# Patient Record
Sex: FEMALE | Race: WHITE | NOT HISPANIC OR LATINO | Employment: FULL TIME | URBAN - METROPOLITAN AREA
[De-identification: names, ages, dates, MRNs, and addresses within clinical notes are randomized per-mention and may not be internally consistent; named-entity substitution may affect disease eponyms.]

---

## 2023-08-16 ENCOUNTER — OFFICE VISIT (OUTPATIENT)
Dept: PRIMARY CARE | Facility: CLINIC | Age: 58
End: 2023-08-16
Payer: COMMERCIAL

## 2023-08-16 VITALS
WEIGHT: 215 LBS | HEIGHT: 62 IN | DIASTOLIC BLOOD PRESSURE: 94 MMHG | BODY MASS INDEX: 39.56 KG/M2 | SYSTOLIC BLOOD PRESSURE: 142 MMHG | HEART RATE: 102 BPM

## 2023-08-16 DIAGNOSIS — E11.9 TYPE 2 DIABETES MELLITUS WITHOUT COMPLICATION, WITHOUT LONG-TERM CURRENT USE OF INSULIN (MULTI): Primary | ICD-10-CM

## 2023-08-16 DIAGNOSIS — Z78.0 MENOPAUSE: ICD-10-CM

## 2023-08-16 DIAGNOSIS — R11.0 NAUSEA: ICD-10-CM

## 2023-08-16 DIAGNOSIS — E66.9 CLASS 2 OBESITY WITH BODY MASS INDEX (BMI) OF 39.0 TO 39.9 IN ADULT, UNSPECIFIED OBESITY TYPE, UNSPECIFIED WHETHER SERIOUS COMORBIDITY PRESENT: ICD-10-CM

## 2023-08-16 DIAGNOSIS — Z12.39 BREAST SCREENING: ICD-10-CM

## 2023-08-16 DIAGNOSIS — I10 PRIMARY HYPERTENSION: ICD-10-CM

## 2023-08-16 DIAGNOSIS — Z76.89 ENCOUNTER TO ESTABLISH CARE: ICD-10-CM

## 2023-08-16 PROBLEM — J45.50 SEVERE PERSISTENT ASTHMA WITHOUT COMPLICATION (MULTI): Status: RESOLVED | Noted: 2020-01-17 | Resolved: 2023-08-16

## 2023-08-16 PROBLEM — J45.50 SEVERE PERSISTENT ASTHMA WITHOUT COMPLICATION (MULTI): Status: ACTIVE | Noted: 2020-01-17

## 2023-08-16 PROBLEM — I67.9 CEREBROVASCULAR DISEASE: Status: ACTIVE | Noted: 2023-08-16

## 2023-08-16 PROCEDURE — 3077F SYST BP >= 140 MM HG: CPT | Performed by: NURSE PRACTITIONER

## 2023-08-16 PROCEDURE — 3008F BODY MASS INDEX DOCD: CPT | Performed by: NURSE PRACTITIONER

## 2023-08-16 PROCEDURE — 4010F ACE/ARB THERAPY RXD/TAKEN: CPT | Performed by: NURSE PRACTITIONER

## 2023-08-16 PROCEDURE — 1036F TOBACCO NON-USER: CPT | Performed by: NURSE PRACTITIONER

## 2023-08-16 PROCEDURE — 3080F DIAST BP >= 90 MM HG: CPT | Performed by: NURSE PRACTITIONER

## 2023-08-16 PROCEDURE — 99203 OFFICE O/P NEW LOW 30 MIN: CPT | Performed by: NURSE PRACTITIONER

## 2023-08-16 RX ORDER — ATORVASTATIN CALCIUM 40 MG/1
40 TABLET, FILM COATED ORAL DAILY
COMMUNITY

## 2023-08-16 RX ORDER — VIT C/E/ZN/COPPR/LUTEIN/ZEAXAN 250MG-90MG
2000 CAPSULE ORAL
COMMUNITY
Start: 2022-06-03

## 2023-08-16 RX ORDER — METFORMIN HYDROCHLORIDE 500 MG/1
1000 TABLET ORAL
Qty: 360 TABLET | Refills: 0 | Status: SHIPPED | OUTPATIENT
Start: 2023-08-16 | End: 2023-11-07

## 2023-08-16 RX ORDER — ONDANSETRON 4 MG/1
4 TABLET, FILM COATED ORAL EVERY 8 HOURS PRN
Qty: 30 TABLET | Refills: 1 | Status: SHIPPED | OUTPATIENT
Start: 2023-08-16 | End: 2024-01-22

## 2023-08-16 RX ORDER — LOSARTAN POTASSIUM 50 MG/1
50 TABLET ORAL DAILY
Qty: 90 TABLET | Refills: 1 | Status: SHIPPED | OUTPATIENT
Start: 2023-08-16 | End: 2024-02-16

## 2023-08-16 RX ORDER — FLUTICASONE FUROATE AND VILANTEROL 200; 25 UG/1; UG/1
POWDER RESPIRATORY (INHALATION)
COMMUNITY
Start: 2021-03-05

## 2023-08-16 RX ORDER — METFORMIN HYDROCHLORIDE 500 MG/1
500 TABLET ORAL
COMMUNITY
End: 2023-08-16 | Stop reason: SDUPTHER

## 2023-08-16 RX ORDER — FLASH GLUCOSE SENSOR
KIT MISCELLANEOUS
Qty: 1 EACH | Refills: 0 | Status: SHIPPED | OUTPATIENT
Start: 2023-08-16

## 2023-08-16 RX ORDER — ASPIRIN 81 MG/1
81 TABLET ORAL DAILY
Qty: 30 TABLET | Refills: 11 | COMMUNITY
Start: 2023-08-16

## 2023-08-16 RX ORDER — CLOPIDOGREL BISULFATE 75 MG/1
75 TABLET ORAL DAILY
COMMUNITY
End: 2023-08-16

## 2023-08-16 RX ORDER — LANCETS 33 GAUGE
EACH MISCELLANEOUS
COMMUNITY
Start: 2022-09-12 | End: 2024-05-24 | Stop reason: WASHOUT

## 2023-08-16 RX ORDER — IBUPROFEN 200 MG
CAPSULE ORAL
COMMUNITY
Start: 2022-06-21

## 2023-08-16 RX ORDER — ONDANSETRON 4 MG/1
4 TABLET, FILM COATED ORAL EVERY 8 HOURS PRN
COMMUNITY
Start: 2019-08-28 | End: 2023-08-16 | Stop reason: SDUPTHER

## 2023-08-16 NOTE — PATIENT INSTRUCTIONS
Thank you for seeing me today.  It was a pleasure to meet you!    #T2DM  A1c= 12%  Increase Metformin to 2 tabs twice daily (total 2000 mg /day)  Begin Ozempic 0.25 mg once weekly x 4 weeks, then increase to 0.5 mg x 4 weeks  C/W Atorvastatin  Schedule eye exam    #HTN  Your blood pressure should be </= 130/80.  Today your blood pressure was 142/94  Begin Losartan 50 mg daily    You should avoid foods that are high in sodium and saturated fats  Exercise daily for at least 30 minutes  minutes/week  Avoid stressful situations as this can increase your blood pressure  Take your medications as prescribed--do not skip doses  Obtain a BP monitor and check your blood pressure at home. Check it around the same time each day, at least 1 hour after taking your medication.  Record your blood pressure in a log and  bring your log with you to your next appointment.    Schedule Mammogram    Lab work ordered today.  Please have your blood drawn in the next 1-2 weeks.  You need to be FASTING for 12 hours prior to blood draw.  You may only have water.  Please contact your insurance company to ask about the best location to get blood drawn.  We will contact you with the results of your blood work and any necessary adjustments  to your plan of care, if you do not hear from us within 3-5 days of having your blood drawn, please call the office at [phone].    RTC 3 MONTHS FOR A1C AND AS NEEDED
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
## 2023-11-07 DIAGNOSIS — E11.9 TYPE 2 DIABETES MELLITUS WITHOUT COMPLICATION, WITHOUT LONG-TERM CURRENT USE OF INSULIN (MULTI): ICD-10-CM

## 2023-11-07 RX ORDER — METFORMIN HYDROCHLORIDE 500 MG/1
1000 TABLET ORAL
Qty: 360 TABLET | Refills: 0 | Status: SHIPPED | OUTPATIENT
Start: 2023-11-07 | End: 2024-01-30

## 2023-11-20 NOTE — PROGRESS NOTES
"Subjective   Chief Complaint   Patient presents with    Follow-up     Ameena is here today for F/U and A1C check.        Patient ID: Ameena Beach is a 58 y.o. female who presents for Follow-up (Ameena is here today for F/U and A1C check. ).  HPI  Est 57 yo female presents today for 3 month f/u on T2DM  Pt is due for A1C    Pt states she is feeling great   Down 21#  Not much of an appetite   Pt requests to stay at Ozempic 0.5 mg weekly   Pt requests refill on Herberth sensors    #T2DM  Last A1c=12% in Aug 2023  Rx: Metformin 2000 mg/day,   Rx Ozempic 0.5 mg weekly (Sunday)  ACE/ARB: yes   STATIN: yes  Last eye exam: 2022    Review of Systems  All 13 systems were reviewed and are within normal limits except positive and pertinent negative responses which are noted below or in HPI.      Objective   /80   Pulse 102   Ht 1.575 m (5' 2\")   Wt 91.6 kg (202 lb)   BMI 36.95 kg/m²        Physical Exam  Vitals reviewed.   Cardiovascular:      Pulses: Normal pulses.      Heart sounds: Normal heart sounds.   Pulmonary:      Effort: Pulmonary effort is normal.      Breath sounds: Normal breath sounds.   Neurological:      Mental Status: She is alert.   Psychiatric:         Mood and Affect: Mood normal.         Thought Content: Thought content normal.         Judgment: Judgment normal.         Assessment/Plan   Problem List Items Addressed This Visit             ICD-10-CM    Type 2 diabetes mellitus without complication, without long-term current use of insulin (CMS/HCC) E11.9    Relevant Medications    semaglutide 0.25 mg or 0.5 mg (2 mg/3 mL) pen injector    blood-glucose sensor (FreeStyle Herberth 3 Sensor) device    Other Relevant Orders    POCT glycosylated hemoglobin (Hb A1C) manually resulted (Completed)       "

## 2023-11-21 ENCOUNTER — OFFICE VISIT (OUTPATIENT)
Dept: PRIMARY CARE | Facility: CLINIC | Age: 58
End: 2023-11-21
Payer: COMMERCIAL

## 2023-11-21 VITALS
SYSTOLIC BLOOD PRESSURE: 124 MMHG | BODY MASS INDEX: 37.17 KG/M2 | WEIGHT: 202 LBS | DIASTOLIC BLOOD PRESSURE: 80 MMHG | HEIGHT: 62 IN | HEART RATE: 102 BPM

## 2023-11-21 DIAGNOSIS — E11.9 TYPE 2 DIABETES MELLITUS WITHOUT COMPLICATION, WITHOUT LONG-TERM CURRENT USE OF INSULIN (MULTI): ICD-10-CM

## 2023-11-21 PROBLEM — D18.01 HEMANGIOMA OF SKIN AND SUBCUTANEOUS TISSUE: Status: ACTIVE | Noted: 2019-06-21

## 2023-11-21 PROBLEM — I63.9 CEREBROVASCULAR ACCIDENT (MULTI): Status: ACTIVE | Noted: 2023-11-21

## 2023-11-21 PROBLEM — D22.70 MELANOCYTIC NEVI OF UNSPECIFIED LOWER LIMB, INCLUDING HIP: Status: ACTIVE | Noted: 2019-06-21

## 2023-11-21 PROBLEM — J45.909 ASTHMA (HHS-HCC): Status: ACTIVE | Noted: 2023-11-21

## 2023-11-21 PROBLEM — D48.5 NEOPLASM OF UNCERTAIN BEHAVIOR OF SKIN: Status: ACTIVE | Noted: 2019-06-21

## 2023-11-21 PROBLEM — D22.5 MELANOCYTIC NEVI OF TRUNK: Status: ACTIVE | Noted: 2019-06-21

## 2023-11-21 LAB — POC HEMOGLOBIN A1C: 6.8 % (ref 4.2–6.5)

## 2023-11-21 PROCEDURE — 3079F DIAST BP 80-89 MM HG: CPT | Performed by: NURSE PRACTITIONER

## 2023-11-21 PROCEDURE — 3074F SYST BP LT 130 MM HG: CPT | Performed by: NURSE PRACTITIONER

## 2023-11-21 PROCEDURE — 4010F ACE/ARB THERAPY RXD/TAKEN: CPT | Performed by: NURSE PRACTITIONER

## 2023-11-21 PROCEDURE — 99213 OFFICE O/P EST LOW 20 MIN: CPT | Performed by: NURSE PRACTITIONER

## 2023-11-21 PROCEDURE — 83036 HEMOGLOBIN GLYCOSYLATED A1C: CPT | Performed by: NURSE PRACTITIONER

## 2023-11-21 PROCEDURE — 1036F TOBACCO NON-USER: CPT | Performed by: NURSE PRACTITIONER

## 2023-11-21 PROCEDURE — 3008F BODY MASS INDEX DOCD: CPT | Performed by: NURSE PRACTITIONER

## 2023-11-21 RX ORDER — CEFUROXIME AXETIL 250 MG/1
6 TABLET ORAL
COMMUNITY
Start: 2018-10-24

## 2023-11-21 RX ORDER — BLOOD-GLUCOSE SENSOR
EACH MISCELLANEOUS
Qty: 3 EACH | Refills: 1 | Status: SHIPPED | OUTPATIENT
Start: 2023-11-21 | End: 2024-04-15

## 2023-11-21 RX ORDER — ELETRIPTAN HYDROBROMIDE 40 MG/1
40 TABLET, FILM COATED ORAL
COMMUNITY
Start: 2021-03-19

## 2023-11-21 NOTE — PATIENT INSTRUCTIONS
Thank you for seeing me today.  It was a pleasure to see you again!     #T2DM  A1c= 6.8%---> GREAT JOB!  Wear supportive footwear; do not walk around barefoot and perform daily foot examination and apply lotion on a regular basis to both feet.  Using a thick emery board will help keep nails short and thin.    Diabetes is not terrible and there are many things you can do to prevent problems from diabetes, such as monitoring blood glucose, watching your diet, keeping fit and taking pills regularly.    Taking diabetes medications or injecting insulin regularly can help control your blood glucose level.  Forgetting to take your medication?, Try to set a repeating alarm on your cell phone to remind you to take medication or insulin injection.      If you test your blood glucose at home, try testing on the sides of your fingertips or rotate your fingers, which helps to minimize pain.    Try brisk walking---a convenient, safe and cost-effective way of exercising!  It's good for your heat and will help control blood glucose.    Limit carbohydrates to 60 gm per meal and 15 gm per snack    RTC 3 MONTHS

## 2024-01-20 DIAGNOSIS — R11.0 NAUSEA: ICD-10-CM

## 2024-01-22 RX ORDER — ONDANSETRON 4 MG/1
4 TABLET, FILM COATED ORAL EVERY 8 HOURS PRN
Qty: 30 TABLET | Refills: 1 | Status: SHIPPED | OUTPATIENT
Start: 2024-01-22 | End: 2024-02-22 | Stop reason: SDUPTHER

## 2024-01-29 DIAGNOSIS — E11.9 TYPE 2 DIABETES MELLITUS WITHOUT COMPLICATION, WITHOUT LONG-TERM CURRENT USE OF INSULIN (MULTI): ICD-10-CM

## 2024-01-30 RX ORDER — METFORMIN HYDROCHLORIDE 500 MG/1
1000 TABLET ORAL
Qty: 360 TABLET | Refills: 0 | Status: SHIPPED | OUTPATIENT
Start: 2024-01-30 | End: 2024-02-22 | Stop reason: ALTCHOICE

## 2024-02-16 DIAGNOSIS — I10 PRIMARY HYPERTENSION: ICD-10-CM

## 2024-02-16 RX ORDER — LOSARTAN POTASSIUM 50 MG/1
50 TABLET ORAL DAILY
Qty: 90 TABLET | Refills: 1 | Status: SHIPPED | OUTPATIENT
Start: 2024-02-16 | End: 2024-02-22 | Stop reason: ALTCHOICE

## 2024-02-19 ASSESSMENT — ENCOUNTER SYMPTOMS
DIZZINESS: 0
SPEECH DIFFICULTY: 0
WEAKNESS: 0
NERVOUS/ANXIOUS: 0
TREMORS: 0
CONFUSION: 0
BLURRED VISION: 0
FATIGUE: 0
WEIGHT LOSS: 0
SEIZURES: 0
BLACKOUTS: 0
HEADACHES: 0
POLYPHAGIA: 0
POLYDIPSIA: 0
VISUAL CHANGE: 0
HUNGER: 0
SWEATS: 0

## 2024-02-21 ASSESSMENT — ENCOUNTER SYMPTOMS
BLURRED VISION: 0
POLYPHAGIA: 0
POLYDIPSIA: 0
SEIZURES: 0
VISUAL CHANGE: 0
NERVOUS/ANXIOUS: 0
WEIGHT LOSS: 0
CONFUSION: 0
SPEECH DIFFICULTY: 0
BLACKOUTS: 0
TREMORS: 0
WEAKNESS: 0
FATIGUE: 0
HUNGER: 0
SWEATS: 0
DIZZINESS: 0
HEADACHES: 0

## 2024-02-22 ENCOUNTER — OFFICE VISIT (OUTPATIENT)
Dept: PRIMARY CARE | Facility: CLINIC | Age: 59
End: 2024-02-22
Payer: COMMERCIAL

## 2024-02-22 VITALS
OXYGEN SATURATION: 96 % | DIASTOLIC BLOOD PRESSURE: 73 MMHG | HEIGHT: 62 IN | HEART RATE: 95 BPM | SYSTOLIC BLOOD PRESSURE: 113 MMHG | WEIGHT: 180 LBS | BODY MASS INDEX: 33.13 KG/M2

## 2024-02-22 DIAGNOSIS — J45.20 MILD INTERMITTENT ASTHMA, UNSPECIFIED WHETHER COMPLICATED (HHS-HCC): ICD-10-CM

## 2024-02-22 DIAGNOSIS — I10 PRIMARY HYPERTENSION: ICD-10-CM

## 2024-02-22 DIAGNOSIS — E11.9 TYPE 2 DIABETES MELLITUS WITHOUT COMPLICATION, WITHOUT LONG-TERM CURRENT USE OF INSULIN (MULTI): Primary | ICD-10-CM

## 2024-02-22 DIAGNOSIS — R11.0 NAUSEA: ICD-10-CM

## 2024-02-22 LAB — POC HEMOGLOBIN A1C: 5.8 % (ref 4.2–6.5)

## 2024-02-22 PROCEDURE — 4010F ACE/ARB THERAPY RXD/TAKEN: CPT | Performed by: NURSE PRACTITIONER

## 2024-02-22 PROCEDURE — 3078F DIAST BP <80 MM HG: CPT | Performed by: NURSE PRACTITIONER

## 2024-02-22 PROCEDURE — 3008F BODY MASS INDEX DOCD: CPT | Performed by: NURSE PRACTITIONER

## 2024-02-22 PROCEDURE — 3074F SYST BP LT 130 MM HG: CPT | Performed by: NURSE PRACTITIONER

## 2024-02-22 PROCEDURE — 1036F TOBACCO NON-USER: CPT | Performed by: NURSE PRACTITIONER

## 2024-02-22 PROCEDURE — 83036 HEMOGLOBIN GLYCOSYLATED A1C: CPT | Performed by: NURSE PRACTITIONER

## 2024-02-22 PROCEDURE — 99213 OFFICE O/P EST LOW 20 MIN: CPT | Performed by: NURSE PRACTITIONER

## 2024-02-22 RX ORDER — ONDANSETRON 4 MG/1
4 TABLET, FILM COATED ORAL EVERY 8 HOURS PRN
Qty: 30 TABLET | Refills: 1 | Status: SHIPPED | OUTPATIENT
Start: 2024-02-22 | End: 2024-05-24 | Stop reason: SDUPTHER

## 2024-02-22 RX ORDER — LOSARTAN POTASSIUM 25 MG/1
25 TABLET ORAL DAILY
Qty: 90 TABLET | Refills: 0 | Status: SHIPPED | OUTPATIENT
Start: 2024-02-22 | End: 2024-05-13

## 2024-02-22 NOTE — PROGRESS NOTES
Subjective   Chief Complaint   Patient presents with    Follow-up     Ameena is here today for routine 3 month F/U and A1C check.       Patient ID: Ameena Beach is a 58 y.o. female who presents for Follow-up (Ameena is here today for routine 3 month F/U and A1C check.).    Diabetes  She has type 2 diabetes mellitus. No MedicAlert identification noted. The initial diagnosis of diabetes was made 2 years ago. Pertinent negatives for hypoglycemia include no confusion, dizziness, headaches, hunger, mood changes, nervousness/anxiousness, pallor, seizures, sleepiness, speech difficulty, sweats or tremors. Pertinent negatives for diabetes include no blurred vision, no chest pain, no fatigue, no foot paresthesias, no foot ulcerations, no polydipsia, no polyphagia, no polyuria, no visual change, no weakness and no weight loss. Hypoglycemia complications include hospitalization. Pertinent negatives for hypoglycemia complications include no blackouts, no nocturnal hypoglycemia, no required assistance and no required glucagon injection. Symptoms are stable. Diabetic complications include a CVA. Pertinent negatives for diabetic complications include no heart disease, impotence, nephropathy, peripheral neuropathy, PVD or retinopathy. There are no known risk factors for coronary artery disease. Current diabetic treatment includes diet and oral agent (dual therapy). She is compliant with treatment all of the time. Her weight is decreasing steadily. She is following a diabetic diet. Meal planning includes avoidance of concentrated sweets, calorie counting and carbohydrate counting. She has not had a previous visit with a dietitian. She participates in exercise daily. She monitors blood glucose at home 3-4 x per week. She monitors urine at home <1 x per month. Blood glucose monitoring compliance is good. There is no change in her home blood glucose trend. Her breakfast blood glucose is taken after 10 am. Her breakfast blood glucose range  "is generally 110-130 mg/dl. Her lunch blood glucose is taken between 1-2 pm. Her lunch blood glucose range is generally 110-130 mg/dl. Her dinner blood glucose is taken between 7-8 pm. Her dinner blood glucose range is generally 110-130 mg/dl. Her overall blood glucose range is 110-130 mg/dl. She does not see a podiatrist.Eye exam is current.       Ameena is an est 59 yo female presenting today for 3 month f/u on T2DM    Patient states to be in usual state of health without any recent illnesses, hospitalizations, and no current or remote infections.     Pt is now taking classes at [employer]   Not currently working   Has lost @ 22# since Nov 2023   Feels great, appetite well controlled with Ozempic     #T2DM  Dx'd 2022  Last A1c=12% in Aug 2023----> 6.8% Nov 2023, due today   Rx: Metformin 2000 mg/day  Rx Ozempic 0.5 mg weekly (Saturday)  ACE/ARB: yes   STATIN: yes  Last eye exam: 2024    Pt would like to increase Ozempic to 1 mg weekly and stop the Metformin     Pt would also like to reduce Losartan to 25 mg daily and monitor her BP at home       Review of Systems   Constitutional:  Negative for fatigue and weight loss.   Eyes:  Negative for blurred vision.   Cardiovascular:  Negative for chest pain.   Endocrine: Negative for polydipsia, polyphagia and polyuria.   Genitourinary:  Negative for impotence.   Skin:  Negative for pallor.   Neurological:  Negative for dizziness, tremors, seizures, speech difficulty, weakness and headaches.   Psychiatric/Behavioral:  Negative for confusion. The patient is not nervous/anxious.      All 13 systems were reviewed and are within normal limits except positive and pertinent negative responses which are noted below or in HPI.    Objective   /73   Pulse 95   Ht 1.575 m (5' 2\")   Wt 81.6 kg (180 lb)   SpO2 96%   BMI 32.92 kg/m²      Current Outpatient Medications   Medication Instructions    aspirin 81 mg, oral, Daily    atorvastatin (LIPITOR) 40 mg, oral, Daily    " blood sugar diagnostic (Blood Glucose Test) strip Use as instructed    blood-glucose sensor (FreeStyle Herberth 3 Sensor) device Use 1 sensor every 14 days.    cholecalciferol (VITAMIN D-3) 2,000 Units, oral, Daily RT    eletriptan (RELPAX) 40 mg, oral    fluticasone furoate-vilanteroL (Breo Ellipta) 200-25 mcg/dose inhaler inhalation, Daily RT    FreeStyle Herberth sensor system (FreeStyle Herberth 2 Sensor) kit Use as instructed    losartan (COZAAR) 25 mg, oral, Daily    ondansetron (ZOFRAN) 4 mg, oral, Every 8 hours PRN    semaglutide (OZEMPIC) 1 mg, subcutaneous, Weekly    SUMAtriptan (IMITREX) 6 mg, subcutaneous    TRUEplus Lancets 33 gauge misc TO USE DAILY TO CHECK BLOOD SUGAR.         Physical Exam  Vitals reviewed.   Cardiovascular:      Pulses: Normal pulses.      Heart sounds: Normal heart sounds.   Pulmonary:      Effort: Pulmonary effort is normal.   Skin:     General: Skin is warm and dry.   Neurological:      Mental Status: She is alert.   Psychiatric:         Mood and Affect: Mood normal.         Assessment/Plan   Problem List Items Addressed This Visit             ICD-10-CM    Type 2 diabetes mellitus without complication, without long-term current use of insulin (CMS/[facility]) - Primary E11.9    Relevant Medications    semaglutide (OZEMPIC) 1 mg/dose (4 mg/3 mL) pen injector    Other Relevant Orders    POCT glycosylated hemoglobin (Hb A1C) manually resulted (Completed)    Asthma J45.909     Condition stable based on symptoms and exam.    Continue established treatment plan and follow-up at least yearly.           Primary hypertension I10    Relevant Medications    losartan (Cozaar) 25 mg tablet    Nausea R11.0    Relevant Medications    ondansetron (Zofran) 4 mg tablet

## 2024-02-22 NOTE — PATIENT INSTRUCTIONS
Thank you for seeing me today.  It was a pleasure to see you again!    #HTN  Your blood pressure should be </= 130/80.  Today your blood pressure was 113/73  Reduce Losartan to 25 mg daily and call if BP > 140/80  You should avoid foods that are high in sodium and saturated fats  Exercise daily for at least 30 minutes  minutes/week  Avoid stressful situations as this can increase your blood pressure  Take your medications as prescribed--do not skip doses  Obtain a BP monitor and check your blood pressure at home. Check it around the same time each day, at least 1 hour after taking your medication.  Record your blood pressure in a log and  bring your log with you to your next appointment.    #T2DM  A1C= 5.8%---> this is amazing!!!  Increase Ozempic to 1 mg weekly  Stop Metformin     Wear supportive footwear; do not walk around barefoot and perform daily foot examination and apply lotion on a regular basis to both feet.  Using a thick emery board will help keep nails short and thin.    Diabetes is not terrible and there are many things you can do to prevent problems from diabetes, such as monitoring blood glucose, watching your diet, keeping fit and taking pills regularly.    Taking diabetes medications or injecting insulin regularly can help control your blood glucose level.  Forgetting to take your medication?, Try to set a repeating alarm on your cell phone to remind you to take medication or insulin injection.      If you test your blood glucose at home, try testing on the sides of your fingertips or rotate your fingers, which helps to minimize pain.    Try brisk walking---a convenient, safe and cost-effective way of exercising!  It's good for your heat and will help control blood glucose.    Limit carbohydrates to 60 gm per meal and 15 gm per snack     Schedule Mammogram    RTC 3 MONTHS FOR A1C CHECK

## 2024-02-22 NOTE — ASSESSMENT & PLAN NOTE
Condition stable based on symptoms and exam.    Continue established treatment plan and follow-up at least yearly.

## 2024-04-13 DIAGNOSIS — E11.9 TYPE 2 DIABETES MELLITUS WITHOUT COMPLICATION, WITHOUT LONG-TERM CURRENT USE OF INSULIN (MULTI): ICD-10-CM

## 2024-04-15 RX ORDER — BLOOD-GLUCOSE SENSOR
EACH MISCELLANEOUS
Qty: 6 EACH | Refills: 1 | Status: SHIPPED | OUTPATIENT
Start: 2024-04-15

## 2024-04-19 DIAGNOSIS — Z12.11 COLON CANCER SCREENING: Primary | ICD-10-CM

## 2024-05-11 DIAGNOSIS — I10 PRIMARY HYPERTENSION: ICD-10-CM

## 2024-05-11 DIAGNOSIS — E11.9 TYPE 2 DIABETES MELLITUS WITHOUT COMPLICATION, WITHOUT LONG-TERM CURRENT USE OF INSULIN (MULTI): ICD-10-CM

## 2024-05-13 RX ORDER — LOSARTAN POTASSIUM 25 MG/1
25 TABLET ORAL DAILY
Qty: 90 TABLET | Refills: 0 | Status: SHIPPED | OUTPATIENT
Start: 2024-05-13

## 2024-05-13 RX ORDER — SEMAGLUTIDE 1.34 MG/ML
1 INJECTION, SOLUTION SUBCUTANEOUS
Qty: 9 ML | Refills: 3 | Status: SHIPPED | OUTPATIENT
Start: 2024-05-19 | End: 2024-05-24 | Stop reason: SDUPTHER

## 2024-05-24 ENCOUNTER — OFFICE VISIT (OUTPATIENT)
Dept: PRIMARY CARE | Facility: CLINIC | Age: 59
End: 2024-05-24
Payer: COMMERCIAL

## 2024-05-24 VITALS
SYSTOLIC BLOOD PRESSURE: 111 MMHG | BODY MASS INDEX: 30.14 KG/M2 | HEART RATE: 102 BPM | WEIGHT: 163.8 LBS | HEIGHT: 62 IN | DIASTOLIC BLOOD PRESSURE: 80 MMHG | OXYGEN SATURATION: 94 %

## 2024-05-24 DIAGNOSIS — E11.9 TYPE 2 DIABETES MELLITUS WITHOUT COMPLICATION, WITHOUT LONG-TERM CURRENT USE OF INSULIN (MULTI): Primary | ICD-10-CM

## 2024-05-24 DIAGNOSIS — H02.729 HYPOTRICHOSIS OF EYELID, UNSPECIFIED LATERALITY: ICD-10-CM

## 2024-05-24 DIAGNOSIS — R11.0 NAUSEA: ICD-10-CM

## 2024-05-24 DIAGNOSIS — Z12.39 BREAST SCREENING: ICD-10-CM

## 2024-05-24 LAB — POC HEMOGLOBIN A1C: 5.6 % (ref 4.2–6.5)

## 2024-05-24 PROCEDURE — 3074F SYST BP LT 130 MM HG: CPT | Performed by: NURSE PRACTITIONER

## 2024-05-24 PROCEDURE — 4010F ACE/ARB THERAPY RXD/TAKEN: CPT | Performed by: NURSE PRACTITIONER

## 2024-05-24 PROCEDURE — 3079F DIAST BP 80-89 MM HG: CPT | Performed by: NURSE PRACTITIONER

## 2024-05-24 PROCEDURE — 1036F TOBACCO NON-USER: CPT | Performed by: NURSE PRACTITIONER

## 2024-05-24 PROCEDURE — 83036 HEMOGLOBIN GLYCOSYLATED A1C: CPT | Performed by: NURSE PRACTITIONER

## 2024-05-24 PROCEDURE — 3008F BODY MASS INDEX DOCD: CPT | Performed by: NURSE PRACTITIONER

## 2024-05-24 PROCEDURE — 99212 OFFICE O/P EST SF 10 MIN: CPT | Performed by: NURSE PRACTITIONER

## 2024-05-24 RX ORDER — SEMAGLUTIDE 1.34 MG/ML
1 INJECTION, SOLUTION SUBCUTANEOUS
Qty: 9 ML | Refills: 3 | Status: SHIPPED | OUTPATIENT
Start: 2024-05-26

## 2024-05-24 RX ORDER — BIMATOPROST 3 UG/ML
SOLUTION TOPICAL
Qty: 5 ML | Refills: 2 | Status: SHIPPED | OUTPATIENT
Start: 2024-05-24 | End: 2025-05-24

## 2024-05-24 RX ORDER — ONDANSETRON 4 MG/1
4 TABLET, FILM COATED ORAL EVERY 8 HOURS PRN
Qty: 30 TABLET | Refills: 2 | Status: SHIPPED | OUTPATIENT
Start: 2024-05-24 | End: 2024-08-22

## 2024-05-24 NOTE — ASSESSMENT & PLAN NOTE
Dx'd 2022  Last A1c=12% in Aug 2023----> 6.8% Nov 2023---> 5.8% Feb 2024---> 5.6% today   Rx Ozempic 1 mg weekly (Saturday)  ACE/ARB: yes   STATIN: yes  Last eye exam: 2024

## 2024-05-24 NOTE — PROGRESS NOTES
"Subjective   Chief Complaint   Patient presents with    Diabetes     Patient is coming in for a follow up for her t2dm. Previous A1c Feb 2024 5.8. We will check it today. Patient is currently taking Ozempic 1 mg daily.Patient reports no questions or concerns at this time.        Patient ID: Ameena Beach is a 58 y.o. female who presents for Diabetes (Patient is coming in for a follow up for her t2dm. Previous A1c Feb 2024 5.8. We will check it today. Patient is currently taking Ozempic 1 mg daily.Patient reports no questions or concerns at this time. ).    HPI  Ameena is a 57 yo female presenting today for 3 month f/u on T2DM after medication changes     Pt is taking classes at [employer]   Not currently working   Has lost @ 39# since Nov 2023   Feels great, appetite well controlled with Ozempic      #T2DM  Dx'd 2022  Last A1c=12% in Aug 2023----> 6.8% Nov 2023---> 5.8% Feb 2024---> 5.6% today   Rx: Metformin 2000 mg/day---> pt stopped taking in Feb 2024  Rx Ozempic 1 mg weekly (Saturday)  ACE/ARB: yes   STATIN: yes  Last eye exam: 2024    Pt requesting solution to help with thinning eyelashes      No Known Allergies    Review of Systems  ROS was completed and all systems are negative with the exception of what was noted in the the HPI.       Objective   /80   Pulse 102   Ht 1.575 m (5' 2\")   Wt 74.3 kg (163 lb 12.8 oz)   SpO2 94%   BMI 29.96 kg/m²      Current Outpatient Medications   Medication Instructions    aspirin 81 mg, oral, Daily    atorvastatin (LIPITOR) 40 mg, oral, Daily    bimatoprost (Latisse) 0.03 % ophthalmic solution Place 1 drop on applicator and apply along skin of upper eyelid at base of eyelashes daily at bedtime; rpt for 2nd eye with clean applicator    blood sugar diagnostic (Blood Glucose Test) strip Use as instructed    blood-glucose sensor (FreeStyle Herberth 3 Sensor) device USE 1 SENSOR EVERY 14 DAYS    cholecalciferol (VITAMIN D-3) 2,000 Units, oral, Daily RT    eletriptan " (RELPAX) 40 mg, oral    fluticasone furoate-vilanteroL (Breo Ellipta) 200-25 mcg/dose inhaler inhalation, Daily RT    FreeStyle Herberth sensor system (FreeStyle Herberth 2 Sensor) kit Use as instructed    losartan (COZAAR) 25 mg, oral, Daily    ondansetron (ZOFRAN) 4 mg, oral, Every 8 hours PRN    [START ON 5/26/2024] Ozempic 1 mg, subcutaneous, Once Weekly    SUMAtriptan (IMITREX) 6 mg, subcutaneous     Office Visit on 05/24/2024   Component Date Value Ref Range Status    POC HEMOGLOBIN A1c 05/24/2024 5.6  4.2 - 6.5 % Final         Physical Exam  Vitals reviewed.   Cardiovascular:      Pulses: Normal pulses.   Pulmonary:      Effort: Pulmonary effort is normal.      Breath sounds: Normal breath sounds.   Neurological:      Mental Status: She is alert and oriented to person, place, and time.   Psychiatric:         Mood and Affect: Mood normal.         Assessment/Plan   Problem List Items Addressed This Visit             ICD-10-CM    Type 2 diabetes mellitus without complication, without long-term current use of insulin ([facility]) - Primary E11.9     Dx'd 2022  Last A1c=12% in Aug 2023----> 6.8% Nov 2023---> 5.8% Feb 2024---> 5.6% today   Rx Ozempic 1 mg weekly (Saturday)  ACE/ARB: yes   STATIN: yes  Last eye exam: 2024         Relevant Medications    semaglutide (Ozempic) 1 mg/dose (4 mg/3 mL) pen injector (Start on 5/26/2024)    Other Relevant Orders    POCT glycosylated hemoglobin (Hb A1C) manually resulted (Completed)    Nausea R11.0    Relevant Medications    ondansetron (Zofran) 4 mg tablet     Other Visit Diagnoses         Codes    Breast screening     Z12.39    Relevant Orders    BI mammo bilateral screening tomosynthesis    Hypotrichosis of eyelid, unspecified laterality     H02.729    Relevant Medications    bimatoprost (Latisse) 0.03 % ophthalmic solution

## 2024-05-24 NOTE — PATIENT INSTRUCTIONS
Thank you for seeing me today.  It was a pleasure to see you again!    Today your A1C was 5.6%---> AWESOME!!!!!!  Continue Ozempic 1 mg weekly   Rx Zofran for nausea     Wear supportive footwear; do not walk around barefoot and perform daily foot examination and apply lotion on a regular basis to both feet.  Using a thick emery board will help keep nails short and thin.    Diabetes is not terrible and there are many things you can do to prevent problems from diabetes, such as monitoring blood glucose, watching your diet, keeping fit and taking pills regularly.    Taking diabetes medications or injecting insulin regularly can help control your blood glucose level.  Forgetting to take your medication?, Try to set a repeating alarm on your cell phone to remind you to take medication or insulin injection.      If you test your blood glucose at home, try testing on the sides of your fingertips or rotate your fingers, which helps to minimize pain.    Try brisk walking---a convenient, safe and cost-effective way of exercising!  It's good for your heat and will help control blood glucose.    Limit carbohydrates to 60 gm per meal and 15 gm per snack      For assistance with scheduling referrals or consultations, please call [phone] or [phone].    For laboratory, radiology, and other tests, please call [phone] ([phone] for pediatrics).   If you do not get results within 7-10 days, or you have any questions or concerns, please send a message, call the office ([phone]), or return to the office for a follow-up appointment.     For acute/sick visits, if you are unable to get an office visit, you can do a [facility] Virtual Visit that is accessible via your My Chart account.  For emergencies, call 9-1-1 or go to the nearest Emergency Department.     Please schedule additional appointment(s) to address concern(s) not addressed today.    Please review prescription inserts and published information for  possible adverse effects of all medications.     In general, results are discussed over the phone or via  [facility].     You can see your health information, review clinical summaries from office visits & test results online when you follow your health with MY  Chart, a personal health record.   To sign up go to [email or web address].   If you need assistance with signing up or trouble getting into your account call [facility] Patient Line 24/7 at [phone]     [facility] FOR CPE AUGUST 2024

## 2024-06-07 ENCOUNTER — HOSPITAL ENCOUNTER (OUTPATIENT)
Dept: RADIOLOGY | Facility: HOSPITAL | Age: 59
Discharge: HOME | End: 2024-06-07
Payer: COMMERCIAL

## 2024-06-07 VITALS — BODY MASS INDEX: 29.26 KG/M2 | HEIGHT: 62 IN | WEIGHT: 159 LBS

## 2024-06-07 DIAGNOSIS — Z12.39 BREAST SCREENING: ICD-10-CM

## 2024-06-07 PROCEDURE — 77067 SCR MAMMO BI INCL CAD: CPT

## 2024-06-11 ENCOUNTER — HOSPITAL ENCOUNTER (OUTPATIENT)
Dept: RADIOLOGY | Facility: EXTERNAL LOCATION | Age: 59
Discharge: HOME | End: 2024-06-11

## 2024-06-11 DIAGNOSIS — Z12.39 ENCOUNTER FOR OTHER SCREENING FOR MALIGNANT NEOPLASM OF BREAST: ICD-10-CM

## 2024-08-10 DIAGNOSIS — I10 PRIMARY HYPERTENSION: ICD-10-CM

## 2024-08-12 RX ORDER — LOSARTAN POTASSIUM 25 MG/1
25 TABLET ORAL DAILY
Qty: 90 TABLET | Refills: 1 | Status: SHIPPED | OUTPATIENT
Start: 2024-08-12

## 2024-09-03 ASSESSMENT — ENCOUNTER SYMPTOMS
WEIGHT LOSS: 0
TREMORS: 0
SEIZURES: 0
FATIGUE: 0
BLURRED VISION: 0
BLACKOUTS: 0
POLYPHAGIA: 0
SPEECH DIFFICULTY: 0
POLYDIPSIA: 0
SWEATS: 0
VISUAL CHANGE: 0
WEAKNESS: 0
HEADACHES: 0
NERVOUS/ANXIOUS: 0
DIZZINESS: 0
HUNGER: 0
CONFUSION: 0

## 2024-09-03 NOTE — PROGRESS NOTES
Subjective   Chief Complaint   Patient presents with    Follow-up     Patient is coming in today for a follow up. Dx: HTN, T2DM       Patient ID: Ameena Beach is a 59 y.o. female who presents for Follow-up (Patient is coming in today for a follow up. Dx: HTN, T2DM).    Diabetes  She has type 2 diabetes mellitus. No MedicAlert identification noted. The initial diagnosis of diabetes was made 1 years ago. Pertinent negatives for hypoglycemia include no confusion, dizziness, headaches, hunger, mood changes, nervousness/anxiousness, pallor, seizures, sleepiness, speech difficulty, sweats or tremors. Pertinent negatives for diabetes include no blurred vision, no chest pain, no fatigue, no foot paresthesias, no foot ulcerations, no polydipsia, no polyphagia, no polyuria, no visual change, no weakness and no weight loss. Pertinent negatives for hypoglycemia complications include no blackouts, no hospitalization, no nocturnal hypoglycemia, no required assistance and no required glucagon injection. Symptoms are stable. Pertinent negatives for diabetic complications include no CVA, heart disease, impotence, nephropathy, peripheral neuropathy, PVD or retinopathy. Risk factors for coronary artery disease include obesity. Current diabetic treatment includes diet and oral agent (monotherapy). She is compliant with treatment all of the time. Her weight is decreasing steadily. She is following a generally healthy diet. Meal planning includes carbohydrate counting. She has not had a previous visit with a dietitian. She participates in exercise daily. She monitors blood glucose at home 1-2 x per week. She monitors urine at home <1 x per month. Blood glucose monitoring compliance is excellent. There is no change in her home blood glucose trend. Her breakfast blood glucose is taken after 10 am. Her breakfast blood glucose range is generally  mg/dl. Her lunch blood glucose is taken after 3 pm. Her lunch blood glucose range is  "generally  mg/dl. Her dinner blood glucose is taken between 6-7 pm. Her dinner blood glucose range is generally  mg/dl. Her bedtime blood glucose is taken after 11 pm. Her bedtime blood glucose range is generally 110-130 mg/dl. Her overall blood glucose range is  mg/dl. She does not see a podiatrist.Eye exam is current.     Ameena is an est 60 yo female presenting today for 3 mo f/u on T2DM    Dx: HTN, T2DM     Pt is taking classes at [employer]   Not currently working     Doing really well with weight loss  Denies any acute c/o today    #T2DM  Dx'd 2022  Last A1c=12% in Aug 2023----> 6.8% Nov 2023---> 5.8% Feb 2024---> 5.6% May 2024----> 5.4% today   Rx: Metformin 2000 mg/day---> pt stopped taking in Feb 2024  Rx Ozempic 1 mg weekly (Thursday)  ACE/ARB: yes   STATIN: yes  Last eye exam: 2024     Pt has been using Latisse to help with thinning eyelashes  States she feels it is helping, would like refill    Pt also requesting refill on Zofran to help with occasional nausea 2/2 Ozempic use     No Known Allergies    Review of Systems   Constitutional:  Negative for fatigue and weight loss.   Eyes:  Negative for blurred vision.   Cardiovascular:  Negative for chest pain.   Endocrine: Negative for polydipsia, polyphagia and polyuria.   Genitourinary:  Negative for impotence.   Skin:  Negative for pallor.   Neurological:  Negative for dizziness, tremors, seizures, speech difficulty, weakness and headaches.   Psychiatric/Behavioral:  Negative for confusion. The patient is not nervous/anxious.      ROS was completed and all systems are negative with the exception of what was noted in the the HPI.       Objective   /73   Pulse 102   Ht 1.575 m (5' 2\")   Wt 67.7 kg (149 lb 3.2 oz)   SpO2 97%   BMI 27.29 kg/m²      Current Outpatient Medications   Medication Instructions    aspirin 81 mg, oral, Daily    atorvastatin (LIPITOR) 40 mg, oral, Daily    bimatoprost (Latisse) 0.03 % ophthalmic solution " Place 1 drop on applicator and apply along skin of upper eyelid at base of eyelashes daily at bedtime; rpt for 2nd eye with clean applicator    blood sugar diagnostic (Blood Glucose Test) strip Use as instructed    blood-glucose sensor (FreeStyle Herberth 3 Sensor) device USE 1 SENSOR EVERY 14 DAYS    cholecalciferol (VITAMIN D-3) 2,000 Units, oral, Daily RT    eletriptan (RELPAX) 40 mg, oral, Once as needed    fluticasone furoate-vilanteroL (Breo Ellipta) 200-25 mcg/dose inhaler inhalation, Daily RT    FreeStyle Herberth sensor system (FreeStyle Herberth 2 Sensor) kit Use as instructed    losartan (COZAAR) 25 mg, oral, Daily    ondansetron ODT (ZOFRAN-ODT) 4 mg, oral, Every 8 hours PRN    [START ON 9/8/2024] Ozempic 1 mg, subcutaneous, Once Weekly    SUMAtriptan (IMITREX) 6 mg, subcutaneous, As needed     Office Visit on 09/04/2024   Component Date Value Ref Range Status    POC HEMOGLOBIN A1c 09/04/2024 5.4  4.2 - 6.5 % Final         Physical Exam  Vitals reviewed.   Cardiovascular:      Pulses: Normal pulses.   Pulmonary:      Effort: Pulmonary effort is normal.   Neurological:      Mental Status: She is alert and oriented to person, place, and time.   Psychiatric:         Mood and Affect: Mood normal.         Assessment & Plan  Type 2 diabetes mellitus without complication, without long-term current use of insulin (Multi)  Dx'd 2022  Last A1c=12% in Aug 2023----> 6.8% Nov 2023---> 5.8% Feb 2024---> 5.6% May 2024 ---> 5.4% today   Rx: Metformin 2000 mg/day---> pt stopped taking in Feb 2024  Rx Ozempic 1 mg weekly (Saturday)  ACE/ARB: yes   STATIN: yes  Last eye exam: 2024    Orders:    POCT glycosylated hemoglobin (Hb A1C) manually resulted    Lipid Panel; Future    atorvastatin (Lipitor) 40 mg tablet; Take 1 tablet (40 mg) by mouth once daily.    semaglutide (Ozempic) 1 mg/dose (4 mg/3 mL) pen injector; Inject 1 mg under the skin 1 (one) time per week.    Primary hypertension  Stable today 103/73  Continue Losartan 25 mg  daily   CMP, Lipids ordered   Orders:    Comprehensive Metabolic Panel; Future    losartan (Cozaar) 25 mg tablet; Take 1 tablet (25 mg) by mouth once daily.    Migraine with aura and without status migrainosus, not intractable  Stable   Last migraine: last month   Orders:    eletriptan (Relpax) 40 mg tablet; Take 1 tablet (40 mg) by mouth 1 time if needed for migraine (MIGRAINE).    SUMAtriptan (Imitrex) 6 mg/0.5 mL injection; Inject 0.5 mL (6 mg) under the skin if needed for migraine.    Hypotrichosis of eyelid, unspecified laterality    Orders:    bimatoprost (Latisse) 0.03 % ophthalmic solution; Place 1 drop on applicator and apply along skin of upper eyelid at base of eyelashes daily at bedtime; rpt for 2nd eye with clean applicator    Nausea    Orders:    ondansetron ODT (Zofran-ODT) 4 mg disintegrating tablet; Take 1 tablet (4 mg) by mouth every 8 hours if needed for nausea or vomiting for up to 7 days.

## 2024-09-03 NOTE — ASSESSMENT & PLAN NOTE
Dx'd 2022  Last A1c=12% in Aug 2023----> 6.8% Nov 2023---> 5.8% Feb 2024---> 5.6% May 2024 ---> 5.4% today   Rx: Metformin 2000 mg/day---> pt stopped taking in Feb 2024  Rx Ozempic 1 mg weekly (Saturday)  ACE/ARB: yes   STATIN: yes  Last eye exam: 2024    Orders:    POCT glycosylated hemoglobin (Hb A1C) manually resulted    Lipid Panel; Future    atorvastatin (Lipitor) 40 mg tablet; Take 1 tablet (40 mg) by mouth once daily.    semaglutide (Ozempic) 1 mg/dose (4 mg/3 mL) pen injector; Inject 1 mg under the skin 1 (one) time per week.

## 2024-09-04 ENCOUNTER — APPOINTMENT (OUTPATIENT)
Dept: PRIMARY CARE | Facility: CLINIC | Age: 59
End: 2024-09-04
Payer: COMMERCIAL

## 2024-09-04 VITALS
DIASTOLIC BLOOD PRESSURE: 73 MMHG | OXYGEN SATURATION: 97 % | HEIGHT: 62 IN | HEART RATE: 102 BPM | WEIGHT: 149.2 LBS | SYSTOLIC BLOOD PRESSURE: 103 MMHG | BODY MASS INDEX: 27.46 KG/M2

## 2024-09-04 DIAGNOSIS — I10 PRIMARY HYPERTENSION: ICD-10-CM

## 2024-09-04 DIAGNOSIS — H02.729 HYPOTRICHOSIS OF EYELID, UNSPECIFIED LATERALITY: ICD-10-CM

## 2024-09-04 DIAGNOSIS — G43.109 MIGRAINE WITH AURA AND WITHOUT STATUS MIGRAINOSUS, NOT INTRACTABLE: ICD-10-CM

## 2024-09-04 DIAGNOSIS — R11.0 NAUSEA: ICD-10-CM

## 2024-09-04 DIAGNOSIS — E11.9 TYPE 2 DIABETES MELLITUS WITHOUT COMPLICATION, WITHOUT LONG-TERM CURRENT USE OF INSULIN (MULTI): Primary | ICD-10-CM

## 2024-09-04 LAB — POC HEMOGLOBIN A1C: 5.4 % (ref 4.2–6.5)

## 2024-09-04 PROCEDURE — 83036 HEMOGLOBIN GLYCOSYLATED A1C: CPT | Performed by: NURSE PRACTITIONER

## 2024-09-04 PROCEDURE — 3078F DIAST BP <80 MM HG: CPT | Performed by: NURSE PRACTITIONER

## 2024-09-04 PROCEDURE — 99213 OFFICE O/P EST LOW 20 MIN: CPT | Performed by: NURSE PRACTITIONER

## 2024-09-04 PROCEDURE — 1036F TOBACCO NON-USER: CPT | Performed by: NURSE PRACTITIONER

## 2024-09-04 PROCEDURE — 3008F BODY MASS INDEX DOCD: CPT | Performed by: NURSE PRACTITIONER

## 2024-09-04 PROCEDURE — 4010F ACE/ARB THERAPY RXD/TAKEN: CPT | Performed by: NURSE PRACTITIONER

## 2024-09-04 PROCEDURE — 3074F SYST BP LT 130 MM HG: CPT | Performed by: NURSE PRACTITIONER

## 2024-09-04 RX ORDER — LOSARTAN POTASSIUM 25 MG/1
25 TABLET ORAL DAILY
Qty: 90 TABLET | Refills: 1 | Status: SHIPPED | OUTPATIENT
Start: 2024-09-04

## 2024-09-04 RX ORDER — ONDANSETRON 4 MG/1
4 TABLET, ORALLY DISINTEGRATING ORAL EVERY 8 HOURS PRN
Qty: 20 TABLET | Refills: 0 | Status: SHIPPED | OUTPATIENT
Start: 2024-09-04 | End: 2024-09-11

## 2024-09-04 RX ORDER — ELETRIPTAN HYDROBROMIDE 40 MG/1
40 TABLET, FILM COATED ORAL ONCE AS NEEDED
Qty: 6 TABLET | Refills: 2 | Status: SHIPPED | OUTPATIENT
Start: 2024-09-04

## 2024-09-04 RX ORDER — BIMATOPROST 3 UG/ML
SOLUTION TOPICAL
Qty: 5 ML | Refills: 2 | Status: SHIPPED | OUTPATIENT
Start: 2024-09-04 | End: 2025-09-04

## 2024-09-04 RX ORDER — CEFUROXIME AXETIL 250 MG/1
6 TABLET ORAL AS NEEDED
Qty: 0.5 ML | Refills: 1 | Status: SHIPPED | OUTPATIENT
Start: 2024-09-04

## 2024-09-04 RX ORDER — ATORVASTATIN CALCIUM 40 MG/1
40 TABLET, FILM COATED ORAL DAILY
Qty: 90 TABLET | Refills: 3 | Status: SHIPPED | OUTPATIENT
Start: 2024-09-04

## 2024-09-04 RX ORDER — SEMAGLUTIDE 1.34 MG/ML
1 INJECTION, SOLUTION SUBCUTANEOUS
Qty: 9 ML | Refills: 3 | Status: SHIPPED | OUTPATIENT
Start: 2024-09-08

## 2024-09-04 ASSESSMENT — ENCOUNTER SYMPTOMS
POLYDIPSIA: 0
SEIZURES: 0
NERVOUS/ANXIOUS: 0
SWEATS: 0
HUNGER: 0
SPEECH DIFFICULTY: 0
POLYPHAGIA: 0
HEADACHES: 0
BLURRED VISION: 0
FATIGUE: 0
VISUAL CHANGE: 0
DIZZINESS: 0
WEAKNESS: 0
CONFUSION: 0
TREMORS: 0
BLACKOUTS: 0
WEIGHT LOSS: 0

## 2024-09-04 NOTE — ASSESSMENT & PLAN NOTE
Stable today 103/73  Continue Losartan 25 mg daily   CMP, Lipids ordered   Orders:    Comprehensive Metabolic Panel; Future    losartan (Cozaar) 25 mg tablet; Take 1 tablet (25 mg) by mouth once daily.

## 2024-09-04 NOTE — ASSESSMENT & PLAN NOTE
Stable   Last migraine: last month   Orders:    eletriptan (Relpax) 40 mg tablet; Take 1 tablet (40 mg) by mouth 1 time if needed for migraine (MIGRAINE).    SUMAtriptan (Imitrex) 6 mg/0.5 mL injection; Inject 0.5 mL (6 mg) under the skin if needed for migraine.

## 2024-09-04 NOTE — PATIENT INSTRUCTIONS
Thank you for seeing me today.  It was a pleasure to see you again!    #T2DM  Today your A1C was 5.4%---> great job!    Wear supportive footwear; do not walk around barefoot and perform daily foot examination and apply lotion on a regular basis to both feet.  Using a thick emery board will help keep nails short and thin.    Diabetes is not terrible and there are many things you can do to prevent problems from diabetes, such as monitoring blood glucose, watching your diet, keeping fit and taking pills regularly.    Taking diabetes medications or injecting insulin regularly can help control your blood glucose level.  Forgetting to take your medication?, Try to set a repeating alarm on your cell phone to remind you to take medication or insulin injection.      If you test your blood glucose at home, try testing on the sides of your fingertips or rotate your fingers, which helps to minimize pain.    Try brisk walking---a convenient, safe and cost-effective way of exercising!  It's good for your heat and will help control blood glucose.    Limit carbohydrates to 60 gm per meal and 15 gm per snack     Lab work ordered today.  Please have your blood drawn in the next 1-2 weeks.  You need to be FASTING for 12 hours prior to blood draw.  You may only have water.  Please contact your insurance company to ask about the best location to get blood drawn.  We will contact you with the results of your blood work and any necessary adjustments  to your plan of care, if you do not hear from us within 3-5 days of having your blood drawn, please call the office at [phone].    For assistance with scheduling referrals or consultations, please call [phone] or [phone].    For laboratory, radiology, and other tests, please call [phone] ([phone] for pediatrics).   If you do not get results within 7-10 days, or you have any questions or concerns, please send a message, call the office ([phone]), or return to the  office for a follow-up appointment.     For acute/sick visits, if you are unable to get an office visit, you can do a  On Demand Virtual Visit that is accessible via your My Chart account.  For emergencies, call 9-1-1 or go to the nearest Emergency Department.     Please schedule additional appointment(s) to address concern(s) not addressed today.    Please review prescription inserts and published information for possible adverse effects of all medications.     In general, results are discussed over the phone or via  [facility].     You can see your health information, review clinical summaries from office visits & test results online when you follow your health with MY  Chart, a personal health record.   To sign up go to [email or web address].   If you need assistance with signing up or trouble getting into your account call [facility] Patient Line 24/7 at [phone]     RT 6 MONTHS AND AS NEEDED

## 2024-09-04 NOTE — ASSESSMENT & PLAN NOTE
Orders:    ondansetron ODT (Zofran-ODT) 4 mg disintegrating tablet; Take 1 tablet (4 mg) by mouth every 8 hours if needed for nausea or vomiting for up to 7 days.     DISCHARGE

## 2024-11-26 DIAGNOSIS — G43.109 MIGRAINE WITH AURA AND WITHOUT STATUS MIGRAINOSUS, NOT INTRACTABLE: ICD-10-CM

## 2024-11-26 RX ORDER — ELETRIPTAN HYDROBROMIDE 40 MG/1
40 TABLET, FILM COATED ORAL ONCE AS NEEDED
Qty: 6 TABLET | Refills: 2 | Status: SHIPPED | OUTPATIENT
Start: 2024-11-26

## 2024-12-06 ENCOUNTER — APPOINTMENT (OUTPATIENT)
Dept: PRIMARY CARE | Facility: CLINIC | Age: 59
End: 2024-12-06
Payer: COMMERCIAL

## 2024-12-16 NOTE — ASSESSMENT & PLAN NOTE
A1C= 5.3%  Continue Ozempic 1 mg weekly  Regular exercise  Routine eye exams     Orders:    POCT glycosylated hemoglobin (Hb A1C) manually resulted    Albumin-Creatinine Ratio, Urine Random; Future

## 2024-12-16 NOTE — PROGRESS NOTES
"Subjective   Chief Complaint   Patient presents with    Follow-up     Ameena Beach is a 59 y.o. female is here today for a follow up. No questions or concerns at this time.           Patient ID: Ameena Baech is a 59 y.o. female who presents for Follow-up (Ameena Beach is a 59 y.o. female is here today for a follow up. No questions or concerns at this time.//).    HPI  Ameena is a 58 yo female presenting today for 3 mo f/u on T2DM  LOV: 9/4/2024    Dx: HTN, T2DM      Pt is taking classes at [employer]   Not currently working      Doing really well with her weight loss journey   Denies any acute c/o today    Needs refill on Zofran      #T2DM  Dx'd 2022  Last A1c=12% in Aug 2023----> 6.8% Nov 2023---> 5.8% Feb 2024---> 5.6% May 2024----> 5.4% Sept 2024  Rx: Metformin 2000 mg/day---> pt stopped in Feb 2024  Rx Ozempic 1 mg weekly (Thursday), c/o mild nausea   ACE/ARB: yes   STATIN: yes  Last eye exam: 2024     BP well controlled today 115/80  Rx Losartan 25 mg daily  No c/o CP/CAMILO     No Known Allergies    Review of Systems  ROS was completed and all systems are negative with the exception of what was noted in the the HPI.       Objective   /80   Pulse 94   Ht 1.575 m (5' 2\")   Wt 59.6 kg (131 lb 6.4 oz)   SpO2 95%   BMI 24.03 kg/m²      Current Outpatient Medications   Medication Instructions    aspirin 81 mg, Daily    atorvastatin (LIPITOR) 40 mg, oral, Daily    bimatoprost (Latisse) 0.03 % ophthalmic solution Place 1 drop on applicator and apply along skin of upper eyelid at base of eyelashes daily at bedtime; rpt for 2nd eye with clean applicator    blood sugar diagnostic (Blood Glucose Test) strip Use as instructed    blood-glucose sensor (FreeStyle Herberth 3 Sensor) device USE 1 SENSOR EVERY 14 DAYS    cholecalciferol (VITAMIN D-3) 2,000 Units, Daily RT    eletriptan (RELPAX) 40 mg, oral, Once as needed    fluticasone furoate-vilanteroL (Breo Ellipta) 200-25 mcg/dose inhaler Daily RT    Johan Kevin" sensor system (FreeStyle Herberth 2 Sensor) kit Use as instructed    losartan (COZAAR) 25 mg, oral, Daily    ondansetron (ZOFRAN) 4 mg, oral, Every 8 hours PRN    ondansetron ODT (ZOFRAN-ODT) 4 mg, oral, Every 8 hours PRN    Ozempic 1 mg, subcutaneous, Once Weekly    SUMAtriptan (IMITREX) 6 mg, subcutaneous, As needed     Office Visit on 12/17/2024   Component Date Value Ref Range Status    POC HEMOGLOBIN A1c 12/17/2024 5.3  4.2 - 6.5 % Final         Physical Exam  Vitals reviewed.   Cardiovascular:      Pulses: Normal pulses.      Heart sounds: Normal heart sounds.   Pulmonary:      Effort: Pulmonary effort is normal.      Breath sounds: Normal breath sounds.   Neurological:      Mental Status: She is alert and oriented to person, place, and time.   Psychiatric:         Mood and Affect: Mood normal.         Assessment & Plan  Type 2 diabetes mellitus without complication, without long-term current use of insulin (Multi)  A1C= 5.3%  Continue Ozempic 1 mg weekly  Regular exercise  Routine eye exams     Orders:    POCT glycosylated hemoglobin (Hb A1C) manually resulted    Albumin-Creatinine Ratio, Urine Random; Future    Primary hypertension  Stable today: 115/80  Continue Losartan 25 mg daily        Nausea  Refill on Zofran   Orders:    ondansetron ODT (Zofran-ODT) 4 mg disintegrating tablet; Dissolve 1 tablet (4 mg) in the mouth every 8 hours if needed for nausea or vomiting for up to 7 days.    Menopause    Orders:    Vitamin D 25-Hydroxy,Total (for eval of Vitamin D levels); Future

## 2024-12-17 ENCOUNTER — APPOINTMENT (OUTPATIENT)
Dept: PRIMARY CARE | Facility: CLINIC | Age: 59
End: 2024-12-17
Payer: COMMERCIAL

## 2024-12-17 VITALS
SYSTOLIC BLOOD PRESSURE: 115 MMHG | HEART RATE: 94 BPM | BODY MASS INDEX: 24.18 KG/M2 | DIASTOLIC BLOOD PRESSURE: 80 MMHG | OXYGEN SATURATION: 95 % | HEIGHT: 62 IN | WEIGHT: 131.4 LBS

## 2024-12-17 DIAGNOSIS — Z78.0 MENOPAUSE: ICD-10-CM

## 2024-12-17 DIAGNOSIS — R11.0 NAUSEA: ICD-10-CM

## 2024-12-17 DIAGNOSIS — E11.9 TYPE 2 DIABETES MELLITUS WITHOUT COMPLICATION, WITHOUT LONG-TERM CURRENT USE OF INSULIN (MULTI): Primary | ICD-10-CM

## 2024-12-17 DIAGNOSIS — I10 PRIMARY HYPERTENSION: ICD-10-CM

## 2024-12-17 LAB — POC HEMOGLOBIN A1C: 5.3 % (ref 4.2–6.5)

## 2024-12-17 PROCEDURE — 3074F SYST BP LT 130 MM HG: CPT | Performed by: NURSE PRACTITIONER

## 2024-12-17 PROCEDURE — 4010F ACE/ARB THERAPY RXD/TAKEN: CPT | Performed by: NURSE PRACTITIONER

## 2024-12-17 PROCEDURE — 83036 HEMOGLOBIN GLYCOSYLATED A1C: CPT | Performed by: NURSE PRACTITIONER

## 2024-12-17 PROCEDURE — 3008F BODY MASS INDEX DOCD: CPT | Performed by: NURSE PRACTITIONER

## 2024-12-17 PROCEDURE — 99213 OFFICE O/P EST LOW 20 MIN: CPT | Performed by: NURSE PRACTITIONER

## 2024-12-17 PROCEDURE — 1036F TOBACCO NON-USER: CPT | Performed by: NURSE PRACTITIONER

## 2024-12-17 PROCEDURE — 3079F DIAST BP 80-89 MM HG: CPT | Performed by: NURSE PRACTITIONER

## 2024-12-17 RX ORDER — ONDANSETRON 4 MG/1
4 TABLET, ORALLY DISINTEGRATING ORAL EVERY 8 HOURS PRN
Qty: 20 TABLET | Refills: 2 | Status: SHIPPED | OUTPATIENT
Start: 2024-12-17 | End: 2024-12-24

## 2024-12-17 NOTE — ASSESSMENT & PLAN NOTE
Refill on Zofran   Orders:    ondansetron ODT (Zofran-ODT) 4 mg disintegrating tablet; Dissolve 1 tablet (4 mg) in the mouth every 8 hours if needed for nausea or vomiting for up to 7 days.

## 2024-12-17 NOTE — PATIENT INSTRUCTIONS
Thank you for seeing me today Ameena Beach, it was a pleasure to see you again!    #T2DM  Today your A1C was 5.3%  Continue Ozempic   Zofran for nausea     Wear supportive footwear; do not walk around barefoot and perform daily foot examination and apply lotion on a regular basis to both feet.  Using a thick emery board will help keep nails short and thin.    Diabetes is not terrible and there are many things you can do to prevent problems from diabetes, such as monitoring blood glucose, watching your diet, keeping fit and taking pills regularly.    Taking diabetes medications or injecting insulin regularly can help control your blood glucose level.  Forgetting to take your medication?, Try to set a repeating alarm on your cell phone to remind you to take medication or insulin injection.      If you test your blood glucose at home, try testing on the sides of your fingertips or rotate your fingers, which helps to minimize pain.    Try brisk walking---a convenient, safe and cost-effective way of exercising!  It's good for your heat and will help control blood glucose.    Limit carbohydrates to 60 gm per meal and 15 gm per snack    #HTN  Blood Pressure was good at 115/80  Continue Losartan 25 mg daily       For assistance with scheduling referrals or consultations, please call [phone] or [phone].    For laboratory, radiology, and other tests, please call [phone] ([phone] for pediatrics).   For laboratory locations, please visit [email or web address]   If you do not get results within 7-10 days, or you have any questions or concerns, please send a message, call the office ([phone]), or return to the office for a follow-up appointment.     For acute/sick visits, if you are unable to get an office visit, you can do a [facility] On Demand Virtual Visit that is accessible via your My Chart account.  For emergencies, call 9-1-1 or go to the nearest Emergency Department.      Please schedule additional appointment(s) to address concern(s) not addressed today.    Please review prescription inserts and published information for possible adverse effects of all medications.     In general, results are discussed over the phone or via  [employer].     You can see your health information, review clinical summaries from office visits & test results online when you follow your health with MY  Chart, a personal health record.   To sign up go to [email or web address].   If you need assistance with signing up or trouble getting into your account call [employer] Patient Line 24/7 at [phone]     RTC 6 MONTHS AND AS NEEDED     Have a nice day and Happy Holidays!     Tiffany Randolph NP

## 2024-12-27 ENCOUNTER — LAB (OUTPATIENT)
Dept: LAB | Facility: LAB | Age: 59
End: 2024-12-27
Payer: COMMERCIAL

## 2024-12-27 DIAGNOSIS — I10 PRIMARY HYPERTENSION: ICD-10-CM

## 2024-12-27 DIAGNOSIS — Z78.0 MENOPAUSE: ICD-10-CM

## 2024-12-27 DIAGNOSIS — E11.9 TYPE 2 DIABETES MELLITUS WITHOUT COMPLICATION, WITHOUT LONG-TERM CURRENT USE OF INSULIN (MULTI): ICD-10-CM

## 2024-12-27 LAB
ALBUMIN SERPL BCP-MCNC: 4.2 G/DL (ref 3.4–5)
ALP SERPL-CCNC: 72 U/L (ref 33–110)
ALT SERPL W P-5'-P-CCNC: 10 U/L (ref 7–45)
ANION GAP SERPL CALC-SCNC: 14 MMOL/L (ref 10–20)
AST SERPL W P-5'-P-CCNC: 16 U/L (ref 9–39)
BILIRUB SERPL-MCNC: 0.7 MG/DL (ref 0–1.2)
BUN SERPL-MCNC: 21 MG/DL (ref 6–23)
CALCIUM SERPL-MCNC: 9.2 MG/DL (ref 8.6–10.3)
CHLORIDE SERPL-SCNC: 103 MMOL/L (ref 98–107)
CHOLEST SERPL-MCNC: 213 MG/DL (ref 0–199)
CHOLESTEROL/HDL RATIO: 3.7
CO2 SERPL-SCNC: 27 MMOL/L (ref 21–32)
CREAT SERPL-MCNC: 0.61 MG/DL (ref 0.5–1.05)
EGFRCR SERPLBLD CKD-EPI 2021: >90 ML/MIN/1.73M*2
GLUCOSE SERPL-MCNC: 89 MG/DL (ref 74–99)
HDLC SERPL-MCNC: 57.6 MG/DL
LDLC SERPL CALC-MCNC: 122 MG/DL
NON HDL CHOLESTEROL: 155 MG/DL (ref 0–149)
POTASSIUM SERPL-SCNC: 3.2 MMOL/L (ref 3.5–5.3)
PROT SERPL-MCNC: 6.5 G/DL (ref 6.4–8.2)
SODIUM SERPL-SCNC: 141 MMOL/L (ref 136–145)
TRIGL SERPL-MCNC: 166 MG/DL (ref 0–149)
VLDL: 33 MG/DL (ref 0–40)

## 2024-12-27 PROCEDURE — 80061 LIPID PANEL: CPT

## 2024-12-27 PROCEDURE — 82306 VITAMIN D 25 HYDROXY: CPT

## 2024-12-27 PROCEDURE — 82570 ASSAY OF URINE CREATININE: CPT

## 2024-12-27 PROCEDURE — 82043 UR ALBUMIN QUANTITATIVE: CPT

## 2024-12-27 PROCEDURE — 80053 COMPREHEN METABOLIC PANEL: CPT

## 2024-12-28 LAB
25(OH)D3 SERPL-MCNC: 35 NG/ML (ref 30–100)
CREAT UR-MCNC: 254.2 MG/DL (ref 20–320)
MICROALBUMIN UR-MCNC: 18.7 MG/L
MICROALBUMIN/CREAT UR: 7.4 UG/MG CREAT

## 2024-12-30 DIAGNOSIS — E87.6 HYPOKALEMIA: Primary | ICD-10-CM

## 2024-12-30 RX ORDER — POTASSIUM CHLORIDE 750 MG/1
20 TABLET, FILM COATED, EXTENDED RELEASE ORAL DAILY
Qty: 6 TABLET | Refills: 0 | Status: SHIPPED | OUTPATIENT
Start: 2024-12-30 | End: 2025-01-02

## 2025-01-20 DIAGNOSIS — G43.109 MIGRAINE WITH AURA AND WITHOUT STATUS MIGRAINOSUS, NOT INTRACTABLE: ICD-10-CM

## 2025-01-20 RX ORDER — CEFUROXIME AXETIL 250 MG/1
6 TABLET ORAL AS NEEDED
Qty: 0.5 ML | Refills: 1 | Status: SHIPPED | OUTPATIENT
Start: 2025-01-20

## 2025-02-18 DIAGNOSIS — G43.109 MIGRAINE WITH AURA AND WITHOUT STATUS MIGRAINOSUS, NOT INTRACTABLE: ICD-10-CM

## 2025-02-18 RX ORDER — ELETRIPTAN HYDROBROMIDE 40 MG/1
40 TABLET, FILM COATED ORAL ONCE AS NEEDED
Qty: 6 TABLET | Refills: 2 | Status: SHIPPED | OUTPATIENT
Start: 2025-02-18

## 2025-02-27 DIAGNOSIS — R11.0 NAUSEA: ICD-10-CM

## 2025-02-27 RX ORDER — ONDANSETRON 4 MG/1
4 TABLET, FILM COATED ORAL EVERY 8 HOURS PRN
Qty: 18 TABLET | Refills: 3 | Status: SHIPPED | OUTPATIENT
Start: 2025-02-27 | End: 2025-05-28

## 2025-03-16 DIAGNOSIS — G43.109 MIGRAINE WITH AURA AND WITHOUT STATUS MIGRAINOSUS, NOT INTRACTABLE: ICD-10-CM

## 2025-03-18 RX ORDER — CEFUROXIME AXETIL 250 MG/1
6 TABLET ORAL AS NEEDED
Qty: 1 ML | Refills: 1 | Status: SHIPPED | OUTPATIENT
Start: 2025-03-18

## 2025-05-11 DIAGNOSIS — G43.109 MIGRAINE WITH AURA AND WITHOUT STATUS MIGRAINOSUS, NOT INTRACTABLE: ICD-10-CM

## 2025-05-12 RX ORDER — ELETRIPTAN HYDROBROMIDE 40 MG/1
40 TABLET, FILM COATED ORAL ONCE AS NEEDED
Qty: 6 TABLET | Refills: 2 | Status: SHIPPED | OUTPATIENT
Start: 2025-05-12

## 2025-05-13 ASSESSMENT — PROMIS GLOBAL HEALTH SCALE
CARRYOUT_PHYSICAL_ACTIVITIES: COMPLETELY
RATE_PHYSICAL_HEALTH: GOOD
EMOTIONAL_PROBLEMS: NEVER
RATE_AVERAGE_PAIN: 0
RATE_GENERAL_HEALTH: GOOD
RATE_QUALITY_OF_LIFE: VERY GOOD
CARRYOUT_SOCIAL_ACTIVITIES: VERY GOOD
RATE_SOCIAL_SATISFACTION: VERY GOOD
RATE_MENTAL_HEALTH: VERY GOOD

## 2025-05-16 DIAGNOSIS — I10 PRIMARY HYPERTENSION: ICD-10-CM

## 2025-05-16 RX ORDER — LOSARTAN POTASSIUM 25 MG/1
25 TABLET ORAL DAILY
Qty: 90 TABLET | Refills: 1 | Status: SHIPPED | OUTPATIENT
Start: 2025-05-16

## 2025-05-19 NOTE — PROGRESS NOTES
Subjective   Reason for Visit: Ameena Beach is an 59 y.o. female here for a CPE     Chief Complaint   Patient presents with    Annual Exam     Ameena Beach is a 59 y.o. female is here today for a CPE. Patient reports No questions or concerns at this time.           HPI  Ameena is a very pleasant 60 yo female presenting today for Annual CPE and f/u on T2DM and HTN   LOV: Dec 2024    Dx: HTN, T2DM, Hx OBESITY     Pt is taking classes at [employer]   Not currently working      Doing really well with her weight loss journey   Pts current weight is 122#, she has lost a total of 93# since Aug 2023   Is taking Ozempic 1 mg weekly   T2DM very well controlled with A1C 4.7% today     #CPE   Occupation: Not currently working     Do you take any herbs or supplements that were not prescribed by a doctor? Vit D3 + K2, MVI     Colon Cancer Screening: Cologuard ordered     LMP: Menopause x 15 yrs   PAP:     Mammogram: DUE 2025     Fasting blood work: UTD, DEC 2024   Last eye exam:   Last dental Exam:   Exercise: regularly   Mood: no concerns   Sleep: no concerns   Vaccines: Prevnar 20     #T2DM  Dx'd   Last A1c=12% in Aug 2023----> 6.8% 2023---> 5.8% 2024---> 5.6% May 2024----> 5.4% 2024--> 4.7% today   Rx: Metformin 2000 mg/day---> pt stopped in 2024  Rx Ozempic 1 mg weekly (Thursday)---> c/o mild nausea, Zofran helps    ACE/ARB: yes   STATIN: yes  Last eye exam:      #HTN  BP today: 118/79  Rx Losartan 25 mg daily  No c/o CP/CAMILO       Health Maintenance Due   Topic Date Due    Colorectal Cancer Screening  Never done    Derm Melanoma Skin Check  Never done    Mammogram  2025       No Known Allergies      Office Visit on 2025   Component Date Value Ref Range Status    POC HEMOGLOBIN A1c 2025 4.7  4.2 - 6.5 % Final         Patient Care Team:  TYRELL Valerio as PCP - General (Family Medicine)  TYRELL Valerio as PCP - MMO ACO PCP  Saranya Chandler  "DO [place]     Review of Systems  ROS was completed and all systems are negative with the exception of what was noted in the the HPI.       Objective     Last Recorded Vitals:  /79   Pulse 98   Ht 1.575 m (5' 2\")   Wt 55.7 kg (122 lb 12.8 oz)   SpO2 95%   BMI 22.46 kg/m²       Surgical History[1]    Current Outpatient Medications   Medication Instructions    aspirin 81 mg, Daily    atorvastatin (LIPITOR) 40 mg, oral, Daily    bimatoprost (Latisse) 0.03 % ophthalmic solution Place 1 drop on applicator and apply along skin of upper eyelid at base of eyelashes daily at bedtime; rpt for 2nd eye with clean applicator    blood sugar diagnostic (Blood Glucose Test) strip Use as instructed    blood-glucose sensor (Campan[employer]Style Herberth 3 Sensor) device USE 1 SENSOR EVERY 14 DAYS    cholecalciferol (VITAMIN D-3) 2,000 Units, Daily RT    eletriptan (RELPAX) 40 mg, oral, Once as needed    fluticasone furoate-vilanteroL (Breo Ellipta) 200-25 mcg/dose inhaler Daily RT    losartan (COZAAR) 25 mg, oral, Daily    ondansetron ODT (ZOFRAN-ODT) 4 mg, oral, Every 8 hours PRN    Ozempic 1 mg, subcutaneous, Once Weekly    SUMAtriptan (IMITREX) 6 mg, subcutaneous, As needed       Physical Exam  Vitals reviewed.   Cardiovascular:      Pulses: Normal pulses.      Heart sounds: Normal heart sounds.   Pulmonary:      Effort: Pulmonary effort is normal.      Breath sounds: Normal breath sounds.   Skin:     General: Skin is warm and dry.   Neurological:      Mental Status: She is alert and oriented to person, place, and time.   Psychiatric:         Mood and Affect: Mood normal.         Thought Content: Thought content normal.         Judgment: Judgment normal.       Assessment & Plan  Annual physical exam  - Counseled on healthy diet and regular exercise  - Fall avoidance information provided  - Personalized prevention plan provided   - Mammogram ordered  - Colon is due  - PAP UTD  - Vaccines: Prevnar 20 today        Type 2 diabetes " mellitus without complication, without long-term current use of insulin  A1C= 4.7%  Continue Ozempic 1 mg weekly  Regular exercise  Incorporate protein into diet     Orders:    POCT glycosylated hemoglobin (Hb A1C) manually resulted     Primary hypertension  Stable today: 118/79  Continue Losartan 25 mg daily           Mild intermittent asthma, unspecified whether complicated (HHS-HCC)  Condition stable based on symptoms and exam.    Continue established treatment plan and follow-up at least yearly.    Nausea  Refill on Zofran     Orders:    ondansetron ODT (Zofran-ODT) 4 mg disintegrating tablet; Dissolve 1 tablet (4 mg) in the mouth every 8 hours if needed for nausea or vomiting.     Hypotrichosis of eyelid, unspecified laterality    Orders:    bimatoprost (Latisse) 0.03 % ophthalmic solution; Place 1 drop on applicator and apply along skin of upper eyelid at base of eyelashes daily at bedtime; rpt for 2nd eye with clean applicator    Colon cancer screening    Orders:    Cologuard® colon cancer screening; Future    Breast screening    Orders:    BI mammo bilateral screening tomosynthesis; Future    Need for vaccination    Orders:    Pneumococcal conjugate vaccine, 20-valent (PREVNAR 20)      I spent a total of 25 minutes on the date of the service which included preparing to see the patient, face-to-face patient care, completing clinical documentation, obtaining and/or reviewing separately obtained history, performing a medically appropriate examination, counseling and educating the patient/family/caregiver, ordering medications and/or tests, communicating with other HCPs (not separately reported), communicating results to the patient/family/caregiver and care coordination (not separately reported).     Assessment, impression and plan is reflected in the note above as well as the orders.     Plan was discussed with the patient and patient signalled understanding of the plan.               [1]   Past Surgical  History:  Procedure Laterality Date    MR HEAD ANGIO WO IV CONTRAST  5/24/2022    MR HEAD ANGIO WO IV CONTRAST [facility]

## 2025-05-20 ENCOUNTER — APPOINTMENT (OUTPATIENT)
Dept: PRIMARY CARE | Facility: CLINIC | Age: 60
End: 2025-05-20
Payer: COMMERCIAL

## 2025-05-20 VITALS
BODY MASS INDEX: 22.6 KG/M2 | SYSTOLIC BLOOD PRESSURE: 118 MMHG | HEIGHT: 62 IN | OXYGEN SATURATION: 95 % | WEIGHT: 122.8 LBS | DIASTOLIC BLOOD PRESSURE: 79 MMHG | HEART RATE: 98 BPM

## 2025-05-20 DIAGNOSIS — Z00.00 ANNUAL PHYSICAL EXAM: Primary | ICD-10-CM

## 2025-05-20 DIAGNOSIS — Z12.39 BREAST SCREENING: ICD-10-CM

## 2025-05-20 DIAGNOSIS — R11.0 NAUSEA: ICD-10-CM

## 2025-05-20 DIAGNOSIS — J45.20 MILD INTERMITTENT ASTHMA, UNSPECIFIED WHETHER COMPLICATED (HHS-HCC): ICD-10-CM

## 2025-05-20 DIAGNOSIS — Z23 NEED FOR VACCINATION: ICD-10-CM

## 2025-05-20 DIAGNOSIS — H02.729 HYPOTRICHOSIS OF EYELID, UNSPECIFIED LATERALITY: ICD-10-CM

## 2025-05-20 DIAGNOSIS — I10 PRIMARY HYPERTENSION: ICD-10-CM

## 2025-05-20 DIAGNOSIS — E11.9 TYPE 2 DIABETES MELLITUS WITHOUT COMPLICATION, WITHOUT LONG-TERM CURRENT USE OF INSULIN: ICD-10-CM

## 2025-05-20 DIAGNOSIS — Z12.11 COLON CANCER SCREENING: ICD-10-CM

## 2025-05-20 PROBLEM — J45.909 ASTHMA: Status: RESOLVED | Noted: 2023-11-21 | Resolved: 2025-05-20

## 2025-05-20 LAB — POC HEMOGLOBIN A1C: 4.7 % (ref 4.2–6.5)

## 2025-05-20 PROCEDURE — 83036 HEMOGLOBIN GLYCOSYLATED A1C: CPT | Performed by: NURSE PRACTITIONER

## 2025-05-20 PROCEDURE — 3044F HG A1C LEVEL LT 7.0%: CPT | Performed by: NURSE PRACTITIONER

## 2025-05-20 PROCEDURE — 3074F SYST BP LT 130 MM HG: CPT | Performed by: NURSE PRACTITIONER

## 2025-05-20 PROCEDURE — 90677 PCV20 VACCINE IM: CPT | Performed by: NURSE PRACTITIONER

## 2025-05-20 PROCEDURE — 1036F TOBACCO NON-USER: CPT | Performed by: NURSE PRACTITIONER

## 2025-05-20 PROCEDURE — 90471 IMMUNIZATION ADMIN: CPT | Performed by: NURSE PRACTITIONER

## 2025-05-20 PROCEDURE — 3078F DIAST BP <80 MM HG: CPT | Performed by: NURSE PRACTITIONER

## 2025-05-20 PROCEDURE — 4010F ACE/ARB THERAPY RXD/TAKEN: CPT | Performed by: NURSE PRACTITIONER

## 2025-05-20 PROCEDURE — 99396 PREV VISIT EST AGE 40-64: CPT | Performed by: NURSE PRACTITIONER

## 2025-05-20 PROCEDURE — 99213 OFFICE O/P EST LOW 20 MIN: CPT | Performed by: NURSE PRACTITIONER

## 2025-05-20 PROCEDURE — 3008F BODY MASS INDEX DOCD: CPT | Performed by: NURSE PRACTITIONER

## 2025-05-20 RX ORDER — BIMATOPROST 3 UG/ML
SOLUTION TOPICAL
Qty: 5 ML | Refills: 2 | Status: SHIPPED | OUTPATIENT
Start: 2025-05-20 | End: 2026-05-20

## 2025-05-20 RX ORDER — ONDANSETRON 4 MG/1
4 TABLET, ORALLY DISINTEGRATING ORAL EVERY 8 HOURS PRN
Qty: 20 TABLET | Refills: 3 | Status: SHIPPED | OUTPATIENT
Start: 2025-05-20

## 2025-05-20 ASSESSMENT — PATIENT HEALTH QUESTIONNAIRE - PHQ9
SUM OF ALL RESPONSES TO PHQ9 QUESTIONS 1 AND 2: 0
2. FEELING DOWN, DEPRESSED OR HOPELESS: NOT AT ALL
1. LITTLE INTEREST OR PLEASURE IN DOING THINGS: NOT AT ALL

## 2025-05-20 NOTE — PATIENT INSTRUCTIONS
Thank you for seeing me today Ameena Beach, it was a pleasure to see you again!    Today we did your Annual Physical Exam and discussed the following:     Continue medications as prescribed     Schedule Mammogram     Do Cologuard     Prevnar 20 today    For assistance with scheduling referrals or consultations, please call [phone] or [phone].    For laboratory, radiology, and other tests, please call [phone] ([phone] for pediatrics).   For laboratory locations, please visit [email or web address]   If you do not get results within 7-10 days, or you have any questions or concerns, please send a message, call the office ([phone]), or return to the office for a follow-up appointment.     For acute/sick visits, if you are unable to get an office visit, you can do a  On Demand Virtual Visit that is accessible via your My Chart account.  For emergencies, call 9-1-1 or go to the nearest Emergency Department.     Please schedule additional appointment(s) to address concern(s) not addressed today.    Please review prescription inserts and published information for possible adverse effects of all medications.     In general, results are discussed over the phone or via  [employer].     You can see your health information, review clinical summaries from office visits & test results online when you follow your health with MY  Chart, a personal health record.   To sign up go to [email or web address].   If you need assistance with signing up or trouble getting into your account call [employer] Patient Line 24/7 at [phone]     RTC 6 MONTHS AND AS NEEDED     ~Tiffany Randolph NP

## 2025-05-20 NOTE — ASSESSMENT & PLAN NOTE
Refill on Zofran     Orders:    ondansetron ODT (Zofran-ODT) 4 mg disintegrating tablet; Dissolve 1 tablet (4 mg) in the mouth every 8 hours if needed for nausea or vomiting.

## 2025-05-20 NOTE — ASSESSMENT & PLAN NOTE
- Counseled on healthy diet and regular exercise  - Fall avoidance information provided  - Personalized prevention plan provided   - Mammogram ordered  - Colon is due  - PAP UTD  - Vaccines: Prevnar 20 today

## 2025-05-20 NOTE — ASSESSMENT & PLAN NOTE
A1C= 4.7%  Continue Ozempic 1 mg weekly  Regular exercise  Incorporate protein into diet     Orders:    POCT glycosylated hemoglobin (Hb A1C) manually resulted

## 2025-05-20 NOTE — ASSESSMENT & PLAN NOTE
Orders:    bimatoprost (Latisse) 0.03 % ophthalmic solution; Place 1 drop on applicator and apply along skin of upper eyelid at base of eyelashes daily at bedtime; rpt for 2nd eye with clean applicator

## 2025-06-04 DIAGNOSIS — G43.109 MIGRAINE WITH AURA AND WITHOUT STATUS MIGRAINOSUS, NOT INTRACTABLE: ICD-10-CM

## 2025-06-04 RX ORDER — CEFUROXIME AXETIL 250 MG/1
6 TABLET ORAL AS NEEDED
Qty: 0.5 ML | Refills: 1 | Status: SHIPPED | OUTPATIENT
Start: 2025-06-04

## 2025-06-05 DIAGNOSIS — E11.9 TYPE 2 DIABETES MELLITUS WITHOUT COMPLICATION, WITHOUT LONG-TERM CURRENT USE OF INSULIN: ICD-10-CM

## 2025-06-05 RX ORDER — SEMAGLUTIDE 1.34 MG/ML
1 INJECTION, SOLUTION SUBCUTANEOUS
Qty: 9 ML | Refills: 3 | Status: SHIPPED | OUTPATIENT
Start: 2025-06-08

## 2025-08-05 DIAGNOSIS — R11.0 NAUSEA: ICD-10-CM

## 2025-08-06 RX ORDER — ONDANSETRON 4 MG/1
4 TABLET, ORALLY DISINTEGRATING ORAL EVERY 8 HOURS PRN
Qty: 20 TABLET | Refills: 0 | Status: SHIPPED | OUTPATIENT
Start: 2025-08-06

## 2025-08-31 DIAGNOSIS — R11.0 NAUSEA: ICD-10-CM

## 2025-09-02 RX ORDER — ONDANSETRON 4 MG/1
4 TABLET, ORALLY DISINTEGRATING ORAL EVERY 8 HOURS PRN
Qty: 20 TABLET | Refills: 0 | Status: SHIPPED | OUTPATIENT
Start: 2025-09-02

## 2025-11-18 ENCOUNTER — APPOINTMENT (OUTPATIENT)
Dept: PRIMARY CARE | Facility: CLINIC | Age: 60
End: 2025-11-18
Payer: COMMERCIAL